# Patient Record
Sex: MALE | Race: WHITE
[De-identification: names, ages, dates, MRNs, and addresses within clinical notes are randomized per-mention and may not be internally consistent; named-entity substitution may affect disease eponyms.]

---

## 2021-05-16 ENCOUNTER — HOSPITAL ENCOUNTER (EMERGENCY)
Dept: HOSPITAL 75 - ER | Age: 66
Discharge: HOME | End: 2021-05-16
Payer: COMMERCIAL

## 2021-05-16 VITALS — HEIGHT: 70 IN | BODY MASS INDEX: 42.92 KG/M2 | WEIGHT: 299.83 LBS

## 2021-05-16 VITALS — SYSTOLIC BLOOD PRESSURE: 140 MMHG | DIASTOLIC BLOOD PRESSURE: 90 MMHG

## 2021-05-16 DIAGNOSIS — Z87.891: ICD-10-CM

## 2021-05-16 DIAGNOSIS — S60.412A: ICD-10-CM

## 2021-05-16 DIAGNOSIS — Z23: ICD-10-CM

## 2021-05-16 DIAGNOSIS — S62.396A: Primary | ICD-10-CM
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
## 2021-05-16 DIAGNOSIS — E66.9: ICD-10-CM

## 2021-05-16 PROCEDURE — 90715 TDAP VACCINE 7 YRS/> IM: CPT

## 2021-05-16 PROCEDURE — 73130 X-RAY EXAM OF HAND: CPT
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
## 2021-05-16 NOTE — DIAGNOSTIC IMAGING REPORT
INDICATION: Right hand pain.



Time of exam: 1:58 AM



3 views of the right hand were obtained. There is a fracture of

the distal 5th metacarpal. There is mild volar angulation of the

distal fracture fragment. Remaining metacarpals and phalanges are

intact. Carpus is intact.



IMPRESSION: Mildly angulated distal 5th metacarpal fracture.



Dictated by: 



  Dictated on workstation # [id number]

## 2021-05-19 ENCOUNTER — HOSPITAL ENCOUNTER (OUTPATIENT)
Dept: HOSPITAL 75 - ORTHO | Age: 66
End: 2021-05-19
Attending: ORTHOPAEDIC SURGERY
Payer: COMMERCIAL
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
## 2021-05-19 DIAGNOSIS — S62.336A: Primary | ICD-10-CM

## 2021-06-07 ENCOUNTER — HOSPITAL ENCOUNTER (OUTPATIENT)
Dept: HOSPITAL 75 - ORTHO | Age: 66
End: 2021-06-07
Attending: ORTHOPAEDIC SURGERY
Payer: SELF-PAY

## 2021-06-07 DIAGNOSIS — S62.336D: Primary | ICD-10-CM
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
## 2021-06-07 PROCEDURE — 73130 X-RAY EXAM OF HAND: CPT

## 2021-06-07 PROCEDURE — 99212 OFFICE O/P EST SF 10 MIN: CPT

## 2021-06-07 NOTE — DIAGNOSTIC IMAGING REPORT
Right hand at 10:23. 



Indication:  Hand pain



3 views were obtained.



The recent exam of 05/16/2021 noted a mildly angulated distal 5th

metacarpal fragment. On this study the fracture line is still

clearly visualized. There is healing callus formation present and

the main fracture fragments seems stable in alignment. No other

fracture or acute bony abnormality identified. 



Impression: 

1. There is a healing fracture of the head/neck of the 5th

metacarpal.

2. There is no acute bony abnormality appreciated. 



Dictated by: 



  Dictated on workstation # [id number]

## 2022-05-04 ENCOUNTER — HOSPITAL ENCOUNTER (OUTPATIENT)
Dept: HOSPITAL 75 - RAD | Age: 67
End: 2022-05-04
Attending: PHYSICIAN ASSISTANT
Payer: MEDICARE

## 2022-05-04 DIAGNOSIS — I49.5: ICD-10-CM

## 2022-05-04 DIAGNOSIS — I65.29: Primary | ICD-10-CM

## 2022-05-04 PROCEDURE — 93880 EXTRACRANIAL BILAT STUDY: CPT

## 2022-05-04 PROCEDURE — 93225 XTRNL ECG REC<48 HRS REC: CPT

## 2022-05-04 PROCEDURE — 93226 XTRNL ECG REC<48 HR SCAN A/R: CPT

## 2022-05-04 PROCEDURE — 93306 TTE W/DOPPLER COMPLETE: CPT

## 2022-05-04 NOTE — DIAGNOSTIC IMAGING REPORT
PROCEDURE: 

US carotid duplex, bilateral.



TECHNIQUE: 

Multiple Real-time grayscale images were obtained over the

carotid arteries in various projections bilaterally. Additional

spectral analysis and color Doppler duplex images were also

obtained.



INDICATION: 

Carotid artery stenosis.



COMPARISON: 

None available.



FINDINGS: 

Minimal plaque is identified within the bilateral carotid

arterial systems. Peak systolic velocities throughout the

bilateral carotid arterial systems are within normal limits.

Additionally, the bilateral internal carotid artery to common

carotid artery ratios are within normal limits.



Antegrade flow within the bilateral vertebral arteries.



IMPRESSION: 

No evidence of hemodynamically significant stenosis.



Antegrade flow within the bilateral vertebral arteries.





Parameters based on the consensus panel Gray-Scale and Doppler

ultrasound criteria published 

November 2003, Radiology, Volume 229. 



DOPPLER (peak systolic velocity M/S 

    

                Right    Left



CCA              .85     1.2



ICA Proximal      .54     .64



ICA Mid           .88     .64

 

ICA Distal        .69     .77



RATIO            .80     .93



ECA              1.1     .89



VERT              .33     .45



Dictated by: 



  Dictated on workstation # [id number]

## 2022-08-29 ENCOUNTER — HOSPITAL ENCOUNTER (OUTPATIENT)
Dept: HOSPITAL 75 - CARD | Age: 67
End: 2022-08-29
Attending: PHYSICIAN ASSISTANT
Payer: MEDICARE

## 2022-08-29 VITALS — SYSTOLIC BLOOD PRESSURE: 133 MMHG | DIASTOLIC BLOOD PRESSURE: 71 MMHG

## 2022-08-29 DIAGNOSIS — I10: Primary | ICD-10-CM

## 2022-11-02 ENCOUNTER — HOSPITAL ENCOUNTER (OUTPATIENT)
Dept: HOSPITAL 75 - ENDO | Age: 67
Discharge: HOME | End: 2022-11-02
Attending: SURGERY
Payer: MEDICARE

## 2022-11-02 VITALS — SYSTOLIC BLOOD PRESSURE: 124 MMHG | DIASTOLIC BLOOD PRESSURE: 75 MMHG

## 2022-11-02 VITALS — SYSTOLIC BLOOD PRESSURE: 119 MMHG | DIASTOLIC BLOOD PRESSURE: 75 MMHG

## 2022-11-02 VITALS — SYSTOLIC BLOOD PRESSURE: 115 MMHG | DIASTOLIC BLOOD PRESSURE: 71 MMHG

## 2022-11-02 VITALS — SYSTOLIC BLOOD PRESSURE: 130 MMHG | DIASTOLIC BLOOD PRESSURE: 70 MMHG

## 2022-11-02 VITALS — DIASTOLIC BLOOD PRESSURE: 70 MMHG | SYSTOLIC BLOOD PRESSURE: 130 MMHG

## 2022-11-02 VITALS — WEIGHT: 264.11 LBS | HEIGHT: 69.69 IN | BODY MASS INDEX: 38.24 KG/M2

## 2022-11-02 DIAGNOSIS — K64.1: ICD-10-CM

## 2022-11-02 DIAGNOSIS — K22.2: ICD-10-CM

## 2022-11-02 DIAGNOSIS — E66.9: ICD-10-CM

## 2022-11-02 DIAGNOSIS — Z12.11: Primary | ICD-10-CM

## 2022-11-02 DIAGNOSIS — K44.9: ICD-10-CM

## 2022-11-02 DIAGNOSIS — K21.00: ICD-10-CM

## 2022-11-02 DIAGNOSIS — Z87.891: ICD-10-CM

## 2022-11-02 DIAGNOSIS — K64.8: ICD-10-CM

## 2022-11-02 NOTE — OPERATIVE REPORT
DATE OF SERVICE:  11/02/2022



ATTENDING PRIMARY CARE PHYSICIAN:

FELICITY Ramos



PREOPERATIVE DIAGNOSES:

Gastroesophageal reflux disease, dysphagia, screening colonoscopy.



POSTOPERATIVE DIAGNOSES:

Reflux esophagitis, [place] grade C with a distal esophageal stricture,

small to moderate size hiatal hernia approximately 2.5 cm in size, severe

gastritis, no distal obstructions.  Chronic stage II external and internal

hemorrhoids.



PROCEDURES:

EGD with biopsy and balloon dilatation.  Colonoscopy.



SURGEON:

Joao Maharaj MD



ANESTHESIA:

Monitored anesthesia care.



ESTIMATED BLOOD LOSS:

Minimal.



FINDINGS:

Reflux esophagitis, [place] grade C with a distal esophageal stricture,

small to moderate size hiatal hernia approximately 2.5 cm in size, severe

gastritis, no distal obstructions.  Chronic stage II external and internal

hemorrhoids.



DISPOSITION:

The patient tolerated the procedure well.



INDICATIONS:

The patient is a 67-year-old male referred over to us for issues with

gastroesophageal reflux disease, which has progressed to dysphagia for usually

dry types of foods like breads or lean meats.  He has had gastroesophageal

reflux disease; however, states that he only takes over-the-counter Tums.  He is

also in need of a screening colonoscopy.  He has not had a colonoscopy up to

this point in his life.  He does not report any major issues with diarrhea, no

constipation as well as no red blood per rectum nor any dark tarry stools and he

does not report any family history of colon cancer.



DESCRIPTION OF PROCEDURE:

The patient was brought to the endoscopy suite, laid in the left lateral

decubitus position.  After adequate IV pain and sedative medications and

monitored anesthesia care, the mouthpiece was applied.



The endoscope was placed in the mouth, visualizing the pharynx and

hypopharyngeal region.  Vocal cords, epiglottis and vallecula identified and

appeared to be normal.  The endoscope was then gently intubated into the

esophageal opening and esophagus insufflated.  The endoscope was then advanced

to the first, second and third portion of esophagus at the level of the GE

junction, a reflux esophagitis, [place] grade C identified with a distal

esophageal stricture.  A biopsy was taken with forceps with visualization of

good hemostasis.



The endoscope was then advanced in the stomach and endoscope retroflexed,

visualizing a small to moderate size hiatal hernia approximately 2.5 cm in size.

 The stricture was also again identified.  There was a severe gastritis more

towards the stomach antrum with some small erosions; however, no formal

ulcerations and a biopsy was taken with forceps to rule out H. pylori with

visualization of good hemostasis.  The endoscope was then advanced to the

pylorus and first and second portion of the duodenum, which appeared normal with

no distal obstructions.



The balloon was then placed in the stomach and pulled back to the area of the

stricture.  We then proceeded in a graded stepwise fashion from 2 then 4

atmospheres of pressure or 19 mm in luminal diameter with moderate resistance. 

There was some small amount of bleeding identified at the Schatzki's ring, no

significant tears.  We left this in place for approximately 60 seconds and the

balloon was desufflated and removed with visualization of good hemostasis as

well as no mucosal tears.  The endoscope was then slowly withdrawn while taking

a second look and suctioning of residual air with no additional findings.



A digital rectal examination was performed, which revealed chronic stage II

external and internal hemorrhoids, not actively edematous nor inflamed and no

bleeding.  Normal sphincter tone was felt and there were no palpable masses. 

Prostate gland was palpable and appeared normal.



The endoscope was then intubated into the anus and rectum gently insufflated. 

The endoscope was then advanced through the valves of [place] of the rectum with

no polyps or any neoplasms identified.  We then proceeded to sigmoid colon where

no significant diverticulosis identified.  The endoscope was then advanced and

remainder of the descending, transverse and ascending colon to the cecum, which

were normal.  There were no polyps or any neoplasms identified throughout the

colon or rectum.  The endoscope was then slowly withdrawn while taking a second

look and suctioning of residual air with no additional findings.



The patient tolerated the procedure well.  We will recommend the necessary

lifestyle and dietary accommodation including small and more frequent meals,

avoiding to eating at night as well as head elevation while lying supine.  He

also needs to avoid spicy, greasy and acidic foods as well as caffeinated

beverages.  We will also start him on Protonix 40 mg daily.  We were able to

dilate to 19 mm; however, we will have him follow up in approximately 6 weeks

for rescheduling for another EGD and dilatation to the goal of 20 mm in luminal

diameter.  We will also recommend a high-fiber diet with a fiber supplement,

which should equal or exceed 25 grams daily to promote soft consistency stools

on a daily basis.  There were no polyps identified and he does not have a family

history of colon cancer, so if he is asymptomatic, he does not need another

colonoscopy for another 10 years.





Job ID: [id number]

DocumentID: [id number]

Dictated Date:  11/02/2022 13:34:01

Transcription Date: 11/02/2022 21:49:53

Dictated By: JOAO MAHARAJ MD

## 2022-11-02 NOTE — PROGRESS NOTE-PRE OPERATIVE
Pre-Operative Progress Note


Date of Available H&P:  Nov 2, 2022


Date H&P Reviewed:  Nov 2, 2022


Time H&P Reviewed:  10:30


History & Physical:  No changes noted


Pre-Operative Diagnosis:  dysphagia, screening o











JOAO SAM MD                 Nov 2, 2022 10:40

## 2022-11-02 NOTE — DISCHARGE INST-SURGICAL
D/C Lap Instructions-KIDO


New, Converted, or Re-Newed RX:  RX on Chart


Follow Up 





Activity as tolerated








High Fiber Diet 25g or more per day





Avoid Alcohol, Caffeine, Spicy Greasy and Acid foods.





Drink 64 fluid oz or more of fluids per day.





Symptoms to Report: Fever over 101 degree F, Nausea/Vomiting 


If any problems/questions: Contact your physician or go to Emergency Room











JOAO SAM MD                 Nov 2, 2022 10:41

## 2022-11-02 NOTE — PROGRESS NOTE-POST OPERATIVE
Post-Operative Progess Note


Surgeon (s)/Assistant (s)


Surgeon


JOAO SAM MD


Assistant:  none





Pre-Operative Diagnosis


dysphagia, screening colo





Post-Operative Diagnosis





reflux esophagitis(grade C)with dist esoph stricture, small-mod HH(2.5cm),


severe gastritis.


chronic stage 2 ext and int hemorrhoids.





Procedure & Operative Findings


Date of Procedure


11/2/22


Procedure Performed/Findings


EGD with bx and balloon dilatation.


colonoscopy


Anesthesia Type


mac





Estimated Blood Loss


Estimated blood loss (mL):  minimal





Specimens/Packing


Specimens Removed


ge jxn, antrum











JOAO SAM MD                 Nov 2, 2022 13:46

## 2022-11-02 NOTE — ANESTHESIA-GENERAL POST-OP
MAC


Patient Condition


Mental Status/LOC:  Same as Preop


Cardiovascular:  Satisfactory


Nausea/Vomiting:  Absent


Respiratory:  Satisfactory


Pain:  Controlled


Complications:  Absent





Post Op Complications


Complications


None





Follow Up Care/Instructions


Patient Instructions


None needed.





Anesthesiology Discharge Order


Discharge Order


Patient is doing well, no complaints, stable vital signs, no apparent adverse 

anesthesia problems.   


No complications reported per nursing.











ROSSI CALERO CRNA             Nov 2, 2022 13:35

## 2022-12-28 ENCOUNTER — HOSPITAL ENCOUNTER (OUTPATIENT)
Dept: HOSPITAL 75 - PREOP | Age: 67
LOS: 2 days | Discharge: HOME | End: 2022-12-30
Attending: SURGERY
Payer: MEDICARE

## 2022-12-28 VITALS — WEIGHT: 269.85 LBS | BODY MASS INDEX: 38.63 KG/M2 | HEIGHT: 70 IN

## 2022-12-28 DIAGNOSIS — R13.10: ICD-10-CM

## 2022-12-28 DIAGNOSIS — Z01.818: Primary | ICD-10-CM

## 2023-01-04 ENCOUNTER — HOSPITAL ENCOUNTER (OUTPATIENT)
Dept: HOSPITAL 75 - ENDO | Age: 68
Discharge: HOME | End: 2023-01-04
Attending: SURGERY
Payer: MEDICARE

## 2023-01-04 VITALS — DIASTOLIC BLOOD PRESSURE: 68 MMHG | SYSTOLIC BLOOD PRESSURE: 115 MMHG

## 2023-01-04 VITALS — BODY MASS INDEX: 38.63 KG/M2 | WEIGHT: 269.85 LBS | HEIGHT: 70 IN

## 2023-01-04 VITALS — SYSTOLIC BLOOD PRESSURE: 115 MMHG | DIASTOLIC BLOOD PRESSURE: 68 MMHG

## 2023-01-04 VITALS — SYSTOLIC BLOOD PRESSURE: 114 MMHG | DIASTOLIC BLOOD PRESSURE: 62 MMHG

## 2023-01-04 VITALS — DIASTOLIC BLOOD PRESSURE: 75 MMHG | SYSTOLIC BLOOD PRESSURE: 132 MMHG

## 2023-01-04 DIAGNOSIS — K21.00: Primary | ICD-10-CM

## 2023-01-04 DIAGNOSIS — K31.89: ICD-10-CM

## 2023-01-04 DIAGNOSIS — K29.80: ICD-10-CM

## 2023-01-04 DIAGNOSIS — Z79.899: ICD-10-CM

## 2023-01-04 DIAGNOSIS — E66.9: ICD-10-CM

## 2023-01-04 DIAGNOSIS — Z87.891: ICD-10-CM

## 2023-01-04 DIAGNOSIS — K22.2: ICD-10-CM

## 2023-01-04 DIAGNOSIS — K29.70: ICD-10-CM

## 2023-01-04 DIAGNOSIS — K31.1: ICD-10-CM

## 2023-01-04 DIAGNOSIS — K44.9: ICD-10-CM

## 2023-01-04 NOTE — OPERATIVE REPORT
DATE OF SERVICE: 01/04/2023



ATTENDING PRIMARY CLINICIAN:

FELICITY Baum



PREOPERATIVE DIAGNOSES:

History of gastroesophageal reflux disease, history of gastritis and 

dysphagia.



POSTOPERATIVE DIAGNOSES:

Reflux esophagitis, [place] grade C with a mild distal esophageal stricture,

small hiatal hernia, 2.5 cm in size, mild to moderate pyloric stricture, mild 

duodenitis, moderate gastritis.



PROCEDURE:

EGD with biopsy and balloon dilatation of the pylorus and distal esophagus, both

to 20 mm.



SURGEON:

Joao Sam MD



ANESTHESIA:

Monitored anesthesia care.



ESTIMATED BLOOD LOSS:

Minimal.



FINDINGS:

Reflux esophagitis, [place] grade C with a mild distal esophageal stricture,

small hiatal hernia, 2.5 cm in size, mild to moderate pyloric stricture, mild 

duodenitis, moderate gastritis.



DISPOSITION:

The patient tolerated the procedure well.



INDICATIONS:

The patient is a 67-year-old male, who was initially referred over to us for 

gastroesophageal reflux disease and dysphagia.  He has had a longstanding 

history of gastroesophageal reflux disease; however, was only taking 

over-the-counter medications.  On 11/02/2022, he underwent EGD and was found to 

have an distal esophageal stricture and underwent balloon dilatation to 19 mm.  

A reflux esophagitis grade C and a small to moderate size hiatal hernia was also

identified.  At that time, we were only able to dilate to 19 mm with a goal of a

20 mm in luminal diameter.



DESCRIPTION OF PROCEDURE:

The patient was brought to the endoscopy suite and laid in the left lateral 

decubitus position.  After adequate IV pain and sedative medications and 

monitored anesthesia care, the mouthpiece was applied.



The endoscope was placed in the mouth, visualizing the pharynx and 

hypopharyngeal region.  Vocal cords, epiglottis and vallecula identified and 

appeared to be normal.  The endoscope was then gently intubated with the 

esophageal opening, esophagus insufflated.  The endoscope was then advanced 

through the first, second and third portions of the esophagus at the level of GE

junction.  A reflux esophagitis [place] grade C identified as well as a mild

distal esophageal stricture.  Biopsy was taken with forceps with visualization 

and good hemostasis.



The endoscope was then advanced into the stomach.  The endoscope was 

retroflexed, visualizing with same small hiatal hernia approximately 2.5 cm in 

size.  At this time, there was retained food substance within the stomach and 

again a moderate gastritis and this time, we visualized a mild to moderate 

pyloric stricture.  There were no ulcerations identified.  A biopsy was taken of

the antrum to rule out H. pylori with visualization of good hemostasis.  The 

endoscope was able to pass through the pylorus and into the duodenum where a 

mild duodenitis was identified.



The balloon was then placed into the duodenum under direct visualization and 

pulled out to the area of the pyloric stricture and then we proceeded with 

graded dilatation to 2, 4, then eventually 6 atmospheres of pressure or 20 mm in

luminal diameter with moderate resistance and left this in place for 60 seconds.

 The balloon was then desufflated with visualization of good hemostasis as well 

as no mucosal tears.



We then proceeded with dilatation of the distal esophageal stricture and pulled 

the balloon to the distal esophagus and proceeded with dilatation in a graded 

stepwise fashion from 2.4, then eventually 6 atmospheres of pressure or 20 mm in

luminal diameter with moderate resistance.  We left this in place for 

approximately 60 seconds and the balloon was desufflated and removed with 

visualization of good hemostasis as well as no mucosal tears.  Endoscope was 

then slowly withdrawn while taking a second look and suctioning of residual air 

with no additional findings.



The patient tolerated the procedure well.  We will recommend the necessary 

lifestyle and dietary accommodation including small more frequent meals, 

avoidance of eating at night as well as head elevation while lying supine.  He 

also needs to avoid caffeinated beverages spicy, greasy and acidic foods and 

continues to take his PPI acid reducer on a daily basis.  We were able to dilate

both the pylorus and the distal esophagus to 20 mm and we will have him follow 

up p.r.n. if he does have continued issues with this recurrent issues with 

dysphagia.





Job ID: [id number]

DocumentID: [id number]

Dictated Date: 01/04/2023 12:04:45

Transcription Date: 01/04/2023 19:54:00

Dictated By: JOAO SAM MD

[facility]

## 2023-01-04 NOTE — PROGRESS NOTE-PRE OPERATIVE
Pre-Operative Progress Note


Date of Available H&P:  Jan 4, 2023


Date H&P Reviewed:  Jan 4, 2023


Time H&P Reviewed:  10:30


History & Physical:  No changes noted


Pre-Operative Diagnosis:  dysphagia,GERD











JOAO SAM MD                 Jan 4, 2023 10:42

## 2023-01-04 NOTE — PROGRESS NOTE-POST OPERATIVE
Post-Operative Progess Note


Surgeon (s)/Assistant (s)


Surgeon


JOAO SAM MD


Assistant:  none





Pre-Operative Diagnosis


dysphagia,GERD





Post-Operative Diagnosis





reflux esophagitis(C), mild dist esoph stricture, small HH(2.5cm),


moderate gastritis, retained gastric food, mild-mod pyloric stricture,


mild duodenitis.





Procedure & Operative Findings


Date of Procedure


1/4/23


Procedure Performed/Findings


EGD with bx and balloon dilatation pylorus and distal esophagus.


Anesthesia Type


mac





Estimated Blood Loss


Estimated blood loss (mL):  minimal





Specimens/Packing


Specimens Removed


ge jxn, antrum











JOAO SAM MD                 Jan 4, 2023 12:26

## 2023-01-04 NOTE — DISCHARGE INST-SURGICAL
D/C Lap Instructions-CECY


Follow Up 





Activity as tolerated








High Fiber Diet 25g or more per day





Avoid Alcohol, Caffeine, Spicy Greasy and Acid foods.





Drink 64 fluid oz or more of fluids per day.





Symptoms to Report: Fever over 101 degree F, Nausea/Vomiting 


If any problems/questions: Contact your physician or go to Emergency Room











JOAO SAM MD                 Jan 4, 2023 10:43